# Patient Record
Sex: MALE | Race: WHITE | NOT HISPANIC OR LATINO | Employment: FULL TIME | URBAN - METROPOLITAN AREA
[De-identification: names, ages, dates, MRNs, and addresses within clinical notes are randomized per-mention and may not be internally consistent; named-entity substitution may affect disease eponyms.]

---

## 2017-04-27 ENCOUNTER — HOSPITAL ENCOUNTER (EMERGENCY)
Facility: HOSPITAL | Age: 52
Discharge: HOME | End: 2017-04-27
Attending: SURGERY
Payer: COMMERCIAL

## 2017-04-27 VITALS
WEIGHT: 315 LBS | BODY MASS INDEX: 42.66 KG/M2 | SYSTOLIC BLOOD PRESSURE: 162 MMHG | HEIGHT: 72 IN | DIASTOLIC BLOOD PRESSURE: 88 MMHG | HEART RATE: 86 BPM | TEMPERATURE: 98 F

## 2017-04-27 DIAGNOSIS — M79.661 RIGHT CALF PAIN: Primary | ICD-10-CM

## 2017-04-27 PROCEDURE — 99284 EMERGENCY DEPT VISIT MOD MDM: CPT | Mod: 25

## 2017-04-27 PROCEDURE — 96372 THER/PROPH/DIAG INJ SC/IM: CPT

## 2017-04-27 PROCEDURE — 63600175 PHARM REV CODE 636 W HCPCS: Performed by: SURGERY

## 2017-04-27 RX ORDER — ENOXAPARIN SODIUM 100 MG/ML
160 INJECTION SUBCUTANEOUS
Status: COMPLETED | OUTPATIENT
Start: 2017-04-27 | End: 2017-04-27

## 2017-04-27 RX ORDER — CYCLOBENZAPRINE HCL 10 MG
10 TABLET ORAL 3 TIMES DAILY PRN
Qty: 10 TABLET | Refills: 0 | Status: SHIPPED | OUTPATIENT
Start: 2017-04-27 | End: 2017-05-02

## 2017-04-27 RX ORDER — KETOROLAC TROMETHAMINE 10 MG/1
10 TABLET, FILM COATED ORAL EVERY 6 HOURS PRN
Qty: 15 TABLET | Refills: 0 | Status: SHIPPED | OUTPATIENT
Start: 2017-04-27 | End: 2017-08-09

## 2017-04-27 RX ORDER — ENOXAPARIN SODIUM 300 MG/3ML
1 INJECTION INTRAVENOUS; SUBCUTANEOUS
Status: DISCONTINUED | OUTPATIENT
Start: 2017-04-27 | End: 2017-04-27 | Stop reason: SDUPTHER

## 2017-04-27 RX ADMIN — ENOXAPARIN SODIUM 160 MG: 80 INJECTION, SOLUTION INTRAVENOUS; SUBCUTANEOUS at 02:04

## 2017-04-27 NOTE — ED PROVIDER NOTES
[facility]                                        April 27, 2017                   Chief Complaint  51 y.o. male with Leg Pain (right calf pain- thinks it's a blood clot)    History of Present Illness  Tavo Coffman presents to the emergency room with right calf pain this week  Patient has a history of for deep vein thrombosis in the left calf in the last 5-10 years  Patient was placed on Eliquis  has been out of his medications for 2-3 weeks now  The patient states that his insurance changed when his hours were reduced at work  The patient has a negative Homans sign good distal pulses and capillary refill today    The history is provided by the patient  Medical history: Carpal tunnel syndrome, knee pain, DVT, DM, HTN, neuropathy  Surgical history: Back surgery and blood clots?  ALLERGIES: Fish and shellfish containing products    Review of Systems and Physical Exam     Review of Systems  -- Constitution - no fever, denies fatigue, no weakness, no chills  -- Eyes - no tearing or redness, no visual disturbance  -- Ear, Nose - no tinnitus or earache, no nasal congestion or discharge  -- Mouth,Throat - no sore throat, no toothache, normal voice, normal swallowing  -- Respiratory - denies cough and congestion, no shortness of breath, no HERNDON  -- Cardiovascular - denies chest pain, no palpitations, denies claudication  -- Gastrointestinal - denies abdominal pain, nausea, vomiting, or diarrhea  -- Musculoskeletal - right calf pain for a week   -- Neurological - no headache, denies weakness or seizure; no LOC  -- Skin - denies pallor, rash, or changes in skin. no hives or welts noted    Vital Signs  -- His oral temperature is 97.5 °F (36.4 °C).   -- His blood pressure is 162/88 and his pulse is 86.      Physical Exam  -- Nursing note and vitals reviewed  -- Head: Atraumatic. Normocephalic. No obvious abnormality  -- Eyes: Pupils are equal and reactive to light. Normal conjunctiva and lids  --  Cardiac: Normal rate, regular rhythm and normal heart sounds  -- Pulmonary: Normal respiratory effort, breath sounds clear to auscultation  -- Abdominal: Soft, no tenderness. Normal bowel sounds. Normal liver edge  -- Musculoskeletal: Normal range of motion, no effusions. Joints stable   -- Neurological: No focal deficits. Showed good interaction with staff  -- Vascular: Posterior tibial, dorsalis pedis and radial pulses 2+ bilaterally      Emergency Room Course     Treatment and Evaluation  -- The US of the lower extremity performed in the ER today was negative for DVT    -- Lovenox 160 mg subcutaneous given in the ER  -- Patient given a card for 1 month supply of Eliquis on discharge    Diagnosis  -- The encounter diagnosis was Right calf pain.    Disposition and Plan  -- Disposition: home  -- Condition: stable  -- Follow-up: Patient to follow up with Phillip Pena MD in 1-2 days.  -- I advised the patient that we have found no life threatening condition today  -- At this time, I believe the patient is clinically stable for discharge.   -- The patient acknowledges that close follow up with a MD is required   -- Patient agrees to comply with all instruction and direction given in the ER    This note is dictated on Dragon Natural Speaking word recognition program.  There are word recognition mistakes that are occasionally missed on review.           Herman Aguiar MD  04/27/17 5844

## 2017-04-27 NOTE — ED TRIAGE NOTES
Patient comes in today stating that he thinks he has another DVT to his right lower leg.  He ran out of his Eliquis about a week ago.  He has a history of DVT in his left leg.

## 2017-04-27 NOTE — ED AVS SNAPSHOT
[facility] [address] [place] [address]               Tavo Coffman   2017 12:29 PM   ED    Description:  Male : 1965   Department:  [facility]           Your Care was Coordinated By:     Provider Role From To    Herman Aguiar MD Attending Provider 17 1230 --      Reason for Visit     Leg Pain           Diagnoses this Visit        Comments    Right calf pain    -  Primary       ED Disposition     ED Disposition Condition Comment    Discharge             To Do List           Follow-up Information     Follow up with Phillip Pena MD. Schedule an appointment as soon as possible for a visit in 2 days.    Specialty:  Internal Medicine    Contact information:     [address]  [place] [address]  [phone]         These Medications        Disp Refills Start End    ketorolac (TORADOL) 10 mg tablet 15 tablet 0 2017     Take 1 tablet (10 mg total) by mouth every 6 (six) hours as needed for Pain. - Oral    Pharmacy: [facility] [address] [place] [address] [facility] [address] Ph #: [phone]       cyclobenzaprine (FLEXERIL) 10 MG tablet 10 tablet 0 2017    Take 1 tablet (10 mg total) by mouth 3 (three) times daily as needed for Muscle spasms. - Oral    Pharmacy: [facility] [address] [place] [address] [facility] [address] Ph #: [phone]         [facility]     [facility] Nurse Care Line -  Assistance  Unless otherwise directed by your provider, please contact [facility], our nurse care line that is available for  assistance.     Registered nurses in the [facility] provide: appointment scheduling, clinical advisement, health education, and other advisory services.  Call: [phone] (toll free)               Medications           Message regarding Medications     Verify the changes and/or additions to your medication regime listed below are the same as discussed with your  clinician today.  If any of these changes or additions are incorrect, please notify your healthcare provider.        START taking these NEW medications        Refills    ketorolac (TORADOL) 10 mg tablet 0    Sig: Take 1 tablet (10 mg total) by mouth every 6 (six) hours as needed for Pain.    Class: Normal    Route: Oral    cyclobenzaprine (FLEXERIL) 10 MG tablet 0    Sig: Take 1 tablet (10 mg total) by mouth 3 (three) times daily as needed for Muscle spasms.    Class: Normal    Route: Oral      These medications were administered today        Dose Freq    enoxaparin injection 160 mg 160 mg ED 1 Time    Sig: Inject 1.6 mLs (160 mg total) into the skin ED 1 Time.    Class: Normal    Route: Subcutaneous      STOP taking these medications     aspirin (ECOTRIN) 81 MG EC tablet Take 1 tablet (81 mg total) by mouth once daily.    azithromycin (Z-DEB) 250 MG tablet Take 2 tabs on first day, then take 1 tab daily    hydrocodone-acetaminophen 5-325mg (NORCO) 5-325 mg per tablet Take 1 tablet by mouth every 8 (eight) hours as needed for Pain.    trazodone (DESYREL) 50 MG tablet Take 0.5 tablets (25 mg total) by mouth nightly as needed for Insomnia.           Verify that the below list of medications is an accurate representation of the medications you are currently taking.  If none reported, the list may be blank. If incorrect, please contact your healthcare provider. Carry this list with you in case of emergency.           Current Medications     ACCU-CHEK MARCELLE PLUS TEST STRP Strp 1 strip 2 (two) times daily.     apixaban (ELIQUIS) 2.5 mg Tab Take 1 tablet (2.5 mg total) by mouth 2 (two) times daily.    atorvastatin (LIPITOR) 40 MG tablet Take 1 tablet (40 mg total) by mouth once daily.    cholecalciferol, vitamin D3, 50,000 unit capsule Take 1 capsule (50,000 Units total) by mouth every 7 days.    fish oil-omega-3 fatty acids 300-1,000 mg capsule Take 2 g by mouth 2 (two) times daily. Take 2 capsules 2 times per day     gabapentin (NEURONTIN) 300 MG capsule Take 2 capsules (600 mg total) by mouth 3 (three) times daily.    glipiZIDE (GLUCOTROL) 5 MG tablet Take 1 tablet (5 mg total) by mouth once daily.    liraglutide 0.6 mg/0.1 mL, 18 mg/3 mL, subq PNIJ (VICTOZA 2-DEB) 0.6 mg/0.1 mL (18 mg/3 mL) PnIj Inject 1.2 mg into the skin once daily. Start low dose victoza 0.6 mg for seven days then Increase to victoza 1.2 mg daily.    lisinopril-hydrochlorothiazide (PRINZIDE,ZESTORETIC) 20-12.5 mg per tablet Take 2 tablets by mouth once daily.    metformin (GLUCOPHAGE) 1000 MG tablet Take 1 tablet (1,000 mg total) by mouth 2 (two) times daily with meals.    pyridoxine (B-6) 50 MG Tab Take 50 mg by mouth once daily.     SITagliptan (JANUVIA) 100 MG Tab Take 1 tablet (100 mg total) by mouth once daily.    cyclobenzaprine (FLEXERIL) 10 MG tablet Take 1 tablet (10 mg total) by mouth 3 (three) times daily as needed for Muscle spasms.    ketorolac (TORADOL) 10 mg tablet Take 1 tablet (10 mg total) by mouth every 6 (six) hours as needed for Pain.           Clinical Reference Information           Your Vitals Were     BP Pulse Temp Height Weight BMI    162/88 (BP Location: Left arm, Patient Position: Sitting) 86 97.5 °F (36.4 °C) (Oral) 6' (1.829 m) 163.3 kg (360 lb) 48.82 kg/m2      Allergies as of 4/27/2017        Reactions    Fish Containing Products Edema    Seafood, crabs    Shellfish Containing Products Edema      Immunizations Administered on Date of Encounter - 4/27/2017     None      ED Micro, Lab, POCT     None      ED Imaging Orders     Start Ordered       Status Ordering Provider    04/27/17 1234 04/27/17 1233  US Lower Extremity Veins Bilateral  1 time imaging      Final result       Your Scheduled Appointments     May 02, 2017  6:30 AM CDT   Fasting Lab with [facility]   [facility] ([facility])    [address]  [place] [address]   [phone]            May 02, 2017  9:00 AM CDT   Class  with CLASS, DIABETES BASICS   [facility] ([facility])    [address] [facility] [address]   [phone]            Aug 10, 2017  9:00 AM CDT   Established Patient Visit with MD HAMZAH Schmidt - Continuing Care ([facility])    [address] [facility] [address]   [phone]            Aug 17, 2017  8:30 AM CDT   Established Patient Visit with MD HAMZAH Little IV - Ophthalmology ([facility])    [address] [facility] [address] [facility] [address]   [phone]            Sep 11, 2017  8:00 AM CDT   Established Patient Visit with ORTHO MARYA CAMPOS - Orthopedics ([facility])    [address] [facility] [address]   [phone]              Smoking Cessation     If you would like to quit smoking:   You may be eligible for free services if you are a [place] resident and started smoking cigarettes before September 1, 1988.  Call the Smoking Cessation Trust (SCT) toll free at (430) 586-9143 or (520) 126-1857.   Call 1-800-QUIT-NOW if you do not meet the above criteria.   Contact us via email: [email or web address]   View our website for more information: [email or web address]         [facility] complies with applicable Federal civil rights laws and does not discriminate on the basis of race, color, national origin, age, disability, or sex.        Language Assistance Services     ATTENTION: Language assistance services are available, free of charge. Please call [phone].      ATENCIÓN: Si habla español, tiene a rasmussen disposición servicios gratuitos de asistencia lingüística. Llame al [phone].     CHÚ Ý: N?u b?n nói Ti?ng Vi?t, có các d?ch v? h? tr? ngôn ng? mi?n phí dành cho b?n. G?i s? [phone].

## 2017-08-10 PROBLEM — E66.01 OBESITY, MORBID, BMI 50 OR HIGHER: Status: ACTIVE | Noted: 2017-08-10

## 2017-08-10 PROBLEM — Z00.00 ROUTINE HEALTH MAINTENANCE: Status: ACTIVE | Noted: 2017-08-10

## 2017-08-10 PROBLEM — F32.A DEPRESSION: Status: ACTIVE | Noted: 2017-08-10

## 2017-10-31 PROBLEM — K62.5 RECTAL BLEEDING: Status: ACTIVE | Noted: 2017-10-31

## 2017-10-31 PROBLEM — Z12.11 SCREENING FOR COLON CANCER: Status: ACTIVE | Noted: 2017-10-31

## 2017-11-13 PROBLEM — Z00.00 ROUTINE HEALTH MAINTENANCE: Status: RESOLVED | Noted: 2017-08-10 | Resolved: 2017-11-13

## 2018-06-13 PROBLEM — G43.011 INTRACTABLE MIGRAINE WITHOUT AURA AND WITH STATUS MIGRAINOSUS: Status: ACTIVE | Noted: 2018-06-13

## 2018-08-09 ENCOUNTER — TELEPHONE (OUTPATIENT)
Dept: PHARMACY | Facility: CLINIC | Age: 53
End: 2018-08-09

## 2018-08-13 NOTE — TELEPHONE ENCOUNTER
DOCUMENTATION ONLY  Aimovig has not yet been added to the patient's prescription insurance formulary. It is currently under review for formulary inclusion with no estimated date for a decision.     [facility] will continue to follow up with the insurance company for a determination of coverage for Aimovig. We will reach out with more information as it becomes available.    Forwarded to Financial Assistance for  options. BRADLEY

## 2018-09-07 ENCOUNTER — PATIENT MESSAGE (OUTPATIENT)
Dept: ADMINISTRATIVE | Facility: OTHER | Age: 53
End: 2018-09-07

## 2018-09-10 ENCOUNTER — TELEPHONE (OUTPATIENT)
Dept: PHARMACY | Facility: CLINIC | Age: 53
End: 2018-09-10

## 2018-09-27 NOTE — TELEPHONE ENCOUNTER
FOR DOCUMENTATION ONLY:  Financial Assistance for Aimovig approved from 9/27/2018 to 09/27/2019  Source: Aimovig Bridge Copay Card:$0.00  BIN: [id number]  YOUNGN: BAL  ID: [id number]  GRP: [id number]

## 2018-10-09 NOTE — TELEPHONE ENCOUNTER
DOCUMENTATION ONLY:  Appeal for Aimovig approved from 10/09/2018 to 04/09/2019   Case ID:[id number]   Co-pay: $35.00  Patient Assistance IS  required    FOR DOCUMENTATION ONLY:   Financial Assistance for Aimovig approved from 9/27/2018 to 09/27/2019   Source: Aimovig Copay Card  Copay$5.00   BIN: [id number]   PCN: BAL   ID: [id number]   GRP: [id number]    MIRA

## 2018-10-23 ENCOUNTER — TELEPHONE (OUTPATIENT)
Dept: ADMINISTRATIVE | Facility: HOSPITAL | Age: 53
End: 2018-10-23

## 2018-11-02 ENCOUNTER — TELEPHONE (OUTPATIENT)
Dept: PHARMACY | Facility: CLINIC | Age: 53
End: 2018-11-02

## 2018-11-02 NOTE — TELEPHONE ENCOUNTER
Initial Aimovig consult completed on . Aimovig 140mg will be shipped on  to arrive at patient's home on  via FedEx. $5.00 copay and permission to charge CC on file. Patient has been on therapy for the past two months. Address confirmed. Confirmed 2 patient identifiers - name and . Therapy Appropriate.    Counseled patient on administration directions:  - Inject 140 mg (two auto-injectors) into the skin every 28 days.   - Take out of the refrigerator 30-60 minutes prior to injection.  - Wash hands before and after injection.  - Monthly RX will come with gauze, bandaids, and alcohol swabs.  - Patient may inject in either the tops of the thighs, abdomen- but at least 2 inches away from belly button, or the outer part of her upper arm (with assistance). If injecting 2 pens, use 2 different injection sites. Patient was instructed to rotate injections sites.  - Patient is to wipe down the injection site with the alcohol pad, wait to dry.    - Remove white cap from pen  - Gently squeeze OR stretch the area of the cleaned skin and hold it firmly.  Place the pen flat against the skin then push down on the purple button and release - there will be an initial click; in 10-15 seconds you will hear a second click and the window will go from from clear to yellow, indicating injection is complete.  - Patient should rotate injection sites.   - Patient will use sharps container; once full, per [place] law, she/ he may lock the sharps container and place in trash. Pharmacy will replace the sharps at no additional charge.    Patient was counseled on possible side effects:  - Injection site reaction: redness, soreness, itching, bruising, which should resolve within 3-5 days.  - constipation  - possible dizziness    Advised to keep a calendar to stay compliant. Consultation included: indication; goals of treatment; administration; storage and handling; side effects; how to handle side effects; the importance of compliance; the  importance of keeping all follow up appointments.  Patient understands to report any medication changes to [facility] and provider. All questions answered and addressed to patients satisfaction. I will f/u with patient in 7-10 days from start, [facility] to contact patient in 3 weeks for refills.     Omid sent at     Keon Burton PharmD  Clinical Pharmacist   [facility]   P: [phone]

## 2018-11-20 ENCOUNTER — TELEPHONE (OUTPATIENT)
Dept: PHARMACY | Facility: CLINIC | Age: 53
End: 2018-11-20

## 2018-12-05 NOTE — TELEPHONE ENCOUNTER
Call attempt 2 for Aimovig refill. Left voicemail and sent [employer] message. We will continue to reach out to the patient if we do not hear back from him.     Sabino Dewey, PharmD  Clinical Pharmacist  [facility]  P: [phone]

## 2018-12-07 NOTE — TELEPHONE ENCOUNTER
Call attempt 3 for Aimovig refill. Left voicemail for patient. Previous [employer] message has not been read. We will attempt to reach the patient once more.     Sabino Dewey, PharmD  Clinical Pharmacist  [facility]  P: [phone]

## 2018-12-18 NOTE — TELEPHONE ENCOUNTER
Patient returned call for Aimovig refill. Verified to ship on 12/19 for delivery on Thursday 12/20. $5.00 copay at 004.     Sabino Dewey, PharmD  Clinical Pharmacist  [facility]  P: [phone]

## 2019-01-10 ENCOUNTER — TELEPHONE (OUTPATIENT)
Dept: PHARMACY | Facility: CLINIC | Age: 54
End: 2019-01-10

## 2019-01-30 ENCOUNTER — TELEPHONE (OUTPATIENT)
Dept: PHARMACY | Facility: CLINIC | Age: 54
End: 2019-01-30

## 2019-03-04 ENCOUNTER — TELEPHONE (OUTPATIENT)
Dept: PHARMACY | Facility: CLINIC | Age: 54
End: 2019-03-04

## 2019-03-06 ENCOUNTER — TELEPHONE (OUTPATIENT)
Dept: PHARMACY | Facility: CLINIC | Age: 54
End: 2019-03-06

## 2019-03-27 ENCOUNTER — HOSPITAL ENCOUNTER (OUTPATIENT)
Dept: SLEEP MEDICINE | Facility: HOSPITAL | Age: 54
Discharge: HOME | End: 2019-03-27
Attending: INTERNAL MEDICINE
Payer: COMMERCIAL

## 2019-03-27 DIAGNOSIS — F51.02 TRANSIENT DISORDER OF INITIATING OR MAINTAINING SLEEP: ICD-10-CM

## 2019-03-27 PROCEDURE — 95811 POLYSOM 6/>YRS CPAP 4/> PARM: CPT

## 2019-04-02 ENCOUNTER — TELEPHONE (OUTPATIENT)
Dept: PHARMACY | Facility: CLINIC | Age: 54
End: 2019-04-02

## 2019-04-29 ENCOUNTER — TELEPHONE (OUTPATIENT)
Dept: PHARMACY | Facility: CLINIC | Age: 54
End: 2019-04-29

## 2019-04-29 NOTE — TELEPHONE ENCOUNTER
LVM for callback regarding update in insurance.  Rx coverage rejecting as coverage ended 3/31/2019.

## 2019-05-01 ENCOUNTER — PATIENT MESSAGE (OUTPATIENT)
Dept: PHARMACY | Facility: CLINIC | Age: 54
End: 2019-05-01

## 2019-05-02 NOTE — TELEPHONE ENCOUNTER
Action Required:     I have faxed over [employer] forms to your office. Completion of the Assistance forms need to be completed by the physician for the patients Aimovig prescription assistance. Please complete the prescription sections of the forms.     Once completed, you can fax them to [facility]. Any questions or concerns regarding the program or completion of the forms, please reach out to [facility].     MD and staff Message sent

## 2019-05-13 NOTE — TELEPHONE ENCOUNTER
We have been unsuccessful at reaching the patient to obtain necessary documents to apply for assistance. We have mailed them a postcard and we are now opting them out of our services until further contact is made. Please let me know if there is anything further I can do to be of assistance.    Thank you,  Carrol Garcia  [facility].  [facility] [id number]

## 2020-02-07 PROBLEM — Z79.01 CHRONIC ANTICOAGULATION: Status: ACTIVE | Noted: 2020-02-07

## 2020-02-07 PROBLEM — Z79.01 CHRONIC ANTICOAGULATION: Chronic | Status: ACTIVE | Noted: 2020-02-07

## 2020-07-22 PROBLEM — Z79.01 CHRONIC ANTICOAGULATION: Chronic | Status: RESOLVED | Noted: 2020-02-07 | Resolved: 2020-07-22

## 2020-07-22 PROBLEM — N49.2 SCROTAL ABSCESS: Status: ACTIVE | Noted: 2020-07-22

## 2021-01-19 PROBLEM — L02.91 ABSCESS: Status: ACTIVE | Noted: 2021-01-19

## 2021-06-11 ENCOUNTER — PATIENT OUTREACH (OUTPATIENT)
Dept: ADMINISTRATIVE | Facility: HOSPITAL | Age: 56
End: 2021-06-11

## 2021-06-16 ENCOUNTER — PATIENT OUTREACH (OUTPATIENT)
Dept: ADMINISTRATIVE | Facility: HOSPITAL | Age: 56
End: 2021-06-16

## 2021-06-21 PROBLEM — G43.011 INTRACTABLE MIGRAINE WITHOUT AURA AND WITH STATUS MIGRAINOSUS: Status: RESOLVED | Noted: 2018-06-13 | Resolved: 2021-06-21

## 2021-07-20 ENCOUNTER — PATIENT OUTREACH (OUTPATIENT)
Dept: ADMINISTRATIVE | Facility: HOSPITAL | Age: 56
End: 2021-07-20

## 2021-07-23 ENCOUNTER — TELEPHONE (OUTPATIENT)
Dept: ADMINISTRATIVE | Facility: HOSPITAL | Age: 56
End: 2021-07-23

## 2021-07-26 ENCOUNTER — PATIENT OUTREACH (OUTPATIENT)
Dept: ADMINISTRATIVE | Facility: HOSPITAL | Age: 56
End: 2021-07-26

## 2021-07-28 ENCOUNTER — PATIENT MESSAGE (OUTPATIENT)
Dept: ADMINISTRATIVE | Facility: OTHER | Age: 56
End: 2021-07-28

## 2021-10-18 ENCOUNTER — PATIENT MESSAGE (OUTPATIENT)
Dept: OTHER | Facility: OTHER | Age: 56
End: 2021-10-18
Payer: MEDICAID

## 2021-10-26 ENCOUNTER — TELEPHONE (OUTPATIENT)
Dept: PHARMACY | Facility: CLINIC | Age: 56
End: 2021-10-26
Payer: MEDICAID

## 2021-12-26 ENCOUNTER — PATIENT MESSAGE (OUTPATIENT)
Dept: OTHER | Facility: OTHER | Age: 56
End: 2021-12-26
Payer: MEDICAID

## 2022-01-13 ENCOUNTER — PATIENT MESSAGE (OUTPATIENT)
Dept: OTHER | Facility: OTHER | Age: 57
End: 2022-01-13
Payer: MEDICAID

## 2022-01-29 DIAGNOSIS — U07.1 COVID-19 VIRUS DETECTED: ICD-10-CM

## 2022-04-14 ENCOUNTER — PATIENT MESSAGE (OUTPATIENT)
Dept: OTHER | Facility: OTHER | Age: 57
End: 2022-04-14
Payer: MEDICAID

## 2022-04-18 ENCOUNTER — PATIENT MESSAGE (OUTPATIENT)
Dept: OTHER | Facility: OTHER | Age: 57
End: 2022-04-18
Payer: MEDICAID

## 2022-05-03 ENCOUNTER — PATIENT MESSAGE (OUTPATIENT)
Dept: OTHER | Facility: OTHER | Age: 57
End: 2022-05-03
Payer: MEDICAID

## 2022-08-01 ENCOUNTER — PATIENT MESSAGE (OUTPATIENT)
Dept: OTHER | Facility: OTHER | Age: 57
End: 2022-08-01
Payer: MEDICAID

## 2022-08-25 PROBLEM — M86.9 OSTEOMYELITIS OF FINGER OF RIGHT HAND: Status: ACTIVE | Noted: 2022-08-25

## 2022-11-13 ENCOUNTER — PATIENT MESSAGE (OUTPATIENT)
Dept: OTHER | Facility: OTHER | Age: 57
End: 2022-11-13
Payer: MEDICAID

## 2023-01-09 ENCOUNTER — PATIENT MESSAGE (OUTPATIENT)
Dept: OTHER | Facility: OTHER | Age: 58
End: 2023-01-09
Payer: MEDICAID

## 2023-01-31 ENCOUNTER — PATIENT MESSAGE (OUTPATIENT)
Dept: ADMINISTRATIVE | Facility: OTHER | Age: 58
End: 2023-01-31
Payer: MEDICAID

## 2023-04-05 PROBLEM — M54.50 CHRONIC MIDLINE LOW BACK PAIN WITHOUT SCIATICA: Status: ACTIVE | Noted: 2023-04-05

## 2023-04-05 PROBLEM — G89.29 CHRONIC MIDLINE LOW BACK PAIN WITHOUT SCIATICA: Status: ACTIVE | Noted: 2023-04-05

## 2023-06-15 ENCOUNTER — PATIENT OUTREACH (OUTPATIENT)
Dept: ADMINISTRATIVE | Facility: HOSPITAL | Age: 58
End: 2023-06-15
Payer: MEDICAID

## 2023-08-09 ENCOUNTER — PATIENT OUTREACH (OUTPATIENT)
Dept: ADMINISTRATIVE | Facility: HOSPITAL | Age: 58
End: 2023-08-09

## 2023-08-11 DIAGNOSIS — E11.9 TYPE 2 DIABETES MELLITUS WITHOUT COMPLICATION: ICD-10-CM

## 2024-02-18 DIAGNOSIS — U07.1 COVID-19 VIRUS DETECTED: ICD-10-CM

## 2024-03-26 ENCOUNTER — PATIENT OUTREACH (OUTPATIENT)
Dept: ADMINISTRATIVE | Facility: HOSPITAL | Age: 59
End: 2024-03-26

## 2024-04-06 PROBLEM — R31.29 MICROSCOPIC HEMATURIA: Status: ACTIVE | Noted: 2024-04-06

## 2024-04-06 PROBLEM — F32.A DEPRESSION: Status: RESOLVED | Noted: 2017-08-10 | Resolved: 2024-04-06

## 2024-04-06 PROBLEM — R07.9 CHEST PAIN: Status: ACTIVE | Noted: 2024-04-06

## 2024-04-06 PROBLEM — G47.33 OSA (OBSTRUCTIVE SLEEP APNEA): Status: ACTIVE | Noted: 2024-04-06

## 2024-04-06 PROBLEM — G47.9 DIFFICULTY SLEEPING: Status: ACTIVE | Noted: 2024-04-06

## 2024-04-06 PROBLEM — E78.5 HYPERLIPIDEMIA: Status: ACTIVE | Noted: 2024-04-06

## 2024-04-07 PROBLEM — I20.0 UNSTABLE ANGINA: Status: ACTIVE | Noted: 2024-04-06

## 2024-04-11 ENCOUNTER — PATIENT MESSAGE (OUTPATIENT)
Dept: ADMINISTRATIVE | Facility: CLINIC | Age: 59
End: 2024-04-11

## 2024-04-11 ENCOUNTER — PATIENT OUTREACH (OUTPATIENT)
Dept: ADMINISTRATIVE | Facility: CLINIC | Age: 59
End: 2024-04-11

## 2024-04-11 NOTE — PROGRESS NOTES
C3 nurse attempted to contact Tavo Coffman  for a TCC post hospital discharge follow up call. No answer. Left voicemail with callback information. The patient has a scheduled HOSFU appointment with MARYA Orozco  on 04/15/2024 @ 7586.     Message sent to PCP staff.

## 2024-04-12 ENCOUNTER — PATIENT OUTREACH (OUTPATIENT)
Dept: ADMINISTRATIVE | Facility: CLINIC | Age: 59
End: 2024-04-12

## 2024-04-12 DIAGNOSIS — I20.0 UNSTABLE ANGINA: Primary | ICD-10-CM

## 2024-04-15 PROBLEM — R07.9 CHEST PAIN: Status: ACTIVE | Noted: 2024-04-15

## 2024-06-21 PROBLEM — N41.0 PROSTATITIS, ACUTE: Status: ACTIVE | Noted: 2024-06-21

## 2024-06-26 ENCOUNTER — PATIENT OUTREACH (OUTPATIENT)
Dept: ADMINISTRATIVE | Facility: CLINIC | Age: 59
End: 2024-06-26

## 2024-06-26 PROBLEM — R78.81 MRSA BACTEREMIA: Status: ACTIVE | Noted: 2024-06-26

## 2024-06-26 PROBLEM — R74.8 ELEVATED ALKALINE PHOSPHATASE LEVEL: Status: ACTIVE | Noted: 2024-06-26

## 2024-06-26 PROBLEM — B95.62 MRSA BACTEREMIA: Status: ACTIVE | Noted: 2024-06-26

## 2024-07-02 PROBLEM — R51.9 HEADACHE: Status: ACTIVE | Noted: 2024-07-02

## 2024-07-09 PROBLEM — R19.7 DIARRHEA OF PRESUMED INFECTIOUS ORIGIN: Status: ACTIVE | Noted: 2024-07-09

## 2024-07-09 PROBLEM — R07.9 CHEST PAIN: Status: RESOLVED | Noted: 2024-04-15 | Resolved: 2024-07-09

## 2024-07-09 PROBLEM — I48.0 PAROXYSMAL ATRIAL FIBRILLATION: Status: ACTIVE | Noted: 2024-07-09

## 2024-07-14 PROBLEM — R19.7 DIARRHEA OF PRESUMED INFECTIOUS ORIGIN: Status: RESOLVED | Noted: 2024-07-09 | Resolved: 2024-07-14

## 2024-07-16 ENCOUNTER — PATIENT OUTREACH (OUTPATIENT)
Dept: ADMINISTRATIVE | Facility: CLINIC | Age: 59
End: 2024-07-16

## 2024-07-16 NOTE — PROGRESS NOTES
C3 nurse attempted to contact Tavo Coffman  for a TCC post hospital discharge follow up call. No answer. No voicemail available.The patient does not have a scheduled HOSFU appointment.   Message sent via [facility] portal for Post Discharge Attempt.

## 2024-07-17 ENCOUNTER — PATIENT MESSAGE (OUTPATIENT)
Dept: ADMINISTRATIVE | Facility: CLINIC | Age: 59
End: 2024-07-17

## 2024-07-20 PROBLEM — R74.8 ELEVATED ALKALINE PHOSPHATASE LEVEL: Status: RESOLVED | Noted: 2024-06-26 | Resolved: 2024-07-20

## 2024-08-02 ENCOUNTER — EXTERNAL HOME HEALTH (OUTPATIENT)
Dept: HOME HEALTH SERVICES | Facility: HOSPITAL | Age: 59
End: 2024-08-02

## 2024-08-06 ENCOUNTER — DOCUMENT SCAN (OUTPATIENT)
Dept: HOME HEALTH SERVICES | Facility: HOSPITAL | Age: 59
End: 2024-08-06

## 2024-08-08 ENCOUNTER — PATIENT MESSAGE (OUTPATIENT)
Dept: ADMINISTRATIVE | Facility: HOSPITAL | Age: 59
End: 2024-08-08

## 2024-10-11 ENCOUNTER — PATIENT MESSAGE (OUTPATIENT)
Dept: ADMINISTRATIVE | Facility: HOSPITAL | Age: 59
End: 2024-10-11

## 2024-10-24 PROBLEM — E11.621 TYPE 2 DIABETES MELLITUS WITH FOOT ULCER, WITH LONG-TERM CURRENT USE OF INSULIN: Status: ACTIVE | Noted: 2024-10-24

## 2024-10-24 PROBLEM — L97.509 TYPE 2 DIABETES MELLITUS WITH FOOT ULCER, WITH LONG-TERM CURRENT USE OF INSULIN: Status: ACTIVE | Noted: 2024-10-24

## 2024-10-24 PROBLEM — Z79.4 TYPE 2 DIABETES MELLITUS WITH FOOT ULCER, WITH LONG-TERM CURRENT USE OF INSULIN: Status: ACTIVE | Noted: 2024-10-24

## 2024-10-24 PROBLEM — I16.1 HYPERTENSIVE EMERGENCY: Status: ACTIVE | Noted: 2024-10-24

## 2024-10-25 PROBLEM — I16.1 HYPERTENSIVE EMERGENCY: Status: RESOLVED | Noted: 2024-10-24 | Resolved: 2024-10-25

## 2024-11-26 ENCOUNTER — PATIENT MESSAGE (OUTPATIENT)
Dept: ADMINISTRATIVE | Facility: HOSPITAL | Age: 59
End: 2024-11-26

## 2024-12-19 PROBLEM — E11.628 TYPE 2 DIABETES MELLITUS WITH SKIN COMPLICATION, WITH LONG-TERM CURRENT USE OF INSULIN: Status: ACTIVE | Noted: 2024-12-19

## 2024-12-19 PROBLEM — Z79.4 TYPE 2 DIABETES MELLITUS WITH SKIN COMPLICATION, WITH LONG-TERM CURRENT USE OF INSULIN: Status: ACTIVE | Noted: 2024-12-19

## 2024-12-19 PROBLEM — I82.401 ACUTE DEEP VEIN THROMBOSIS (DVT) OF RIGHT LOWER EXTREMITY: Status: ACTIVE | Noted: 2024-12-19

## 2024-12-23 ENCOUNTER — PATIENT MESSAGE (OUTPATIENT)
Dept: ADMINISTRATIVE | Facility: HOSPITAL | Age: 59
End: 2024-12-23

## 2024-12-25 PROBLEM — M79.604 RIGHT LEG PAIN: Status: ACTIVE | Noted: 2024-12-25

## 2024-12-26 PROBLEM — G43.709 CHRONIC MIGRAINE WITHOUT AURA: Status: ACTIVE | Noted: 2020-04-03

## 2024-12-27 ENCOUNTER — ANTI-COAG VISIT (OUTPATIENT)
Dept: CARDIOLOGY | Facility: CLINIC | Age: 59
End: 2024-12-27

## 2024-12-27 DIAGNOSIS — Z79.01 ANTICOAGULANT LONG-TERM USE: Primary | ICD-10-CM

## 2024-12-27 DIAGNOSIS — I82.409 RECURRENT DEEP VEIN THROMBOSIS (DVT): ICD-10-CM

## 2024-12-27 NOTE — PROGRESS NOTES
60 y/o M w/ hx DMII, recurrent DVTs on Xarelto (since 1992, first occurred after surgery), pAF, HTN, HLD, chronic back pain, venous stasis ulcers and MRSA bacteremia who presented to ED with complaints of RLE pain and swelling x 2 days.  US confirms DVT RLE.  Discussed with oncology, will require transition back to warfarin due to DOAC failure with higher INR goal as warfarin failed w/ goal 2.0-3.0.

## 2024-12-31 NOTE — PROGRESS NOTES
Pt has been d/c'd from the hospital on lovenox bridge + high dose warfarin 25mg po daily.  Recommend to adjust dosing per calendar + continue lovenox bridge until pt has been WNL for minimum 48hrs.  Plan to re-assess Thursday. INR moved appropriately w/ warfarin 20mg daily; however, not quite to target.  Pt would likely benefit from alternating 20mg/25mg dosing ~15% increase.  Will continue to monitor closely.

## 2025-01-03 ENCOUNTER — PATIENT MESSAGE (OUTPATIENT)
Dept: CARDIOLOGY | Facility: CLINIC | Age: 60
End: 2025-01-03

## 2025-01-03 NOTE — PROGRESS NOTES
Pt not reached until today & took a lower dose than recommended.  Will advise per calendar & f/u has been r/s'd.

## 2025-01-03 NOTE — PROGRESS NOTES
Spoke to patient regarding enrollment into the Coumadin Clinic. Patient verified that he has a (peach) 5 mg warfarin tablet, and a warfarin regimen of 20 mg (4 tablets) qPM. INR scheduled for 01.06.2025.    Patient educated on diet and when to call the coumadin Clinic. Patient will eat foods with vitamin K daily, and avoid EtOH. The risks associated with consuming EtOH, while taking warfarin, and the importance of a consistent diet discussed. Questions and concerns addressed at this time, and he expressed understanding. Education sheets sent via patient portal to reinforce teaching.     Update - Patient advised on boosted warfarin dosing of 25 mg 1/3-1/4.

## 2025-01-06 ENCOUNTER — ANTI-COAG VISIT (OUTPATIENT)
Dept: CARDIOLOGY | Facility: CLINIC | Age: 60
End: 2025-01-06

## 2025-01-06 DIAGNOSIS — I82.409 RECURRENT DEEP VEIN THROMBOSIS (DVT): Primary | ICD-10-CM

## 2025-01-07 NOTE — PROGRESS NOTES
[facility]    2025 12:00 PM    Assessment/Plan:    Patient presents today with therapeutic INR.    Assessment of patient findings and chart review: Pt took a lower dose than recommended.  See calendar.     Recommendation for patient's warfarin regimen: Ok to d/c lovenox & will adjust warfarin per calendar.     Recommend repeat INR Thursday.   _________________________________________________________________    Tavo Coffman (59 y.o.) is followed by the [employer] [facility].    Anticoagulation Summary  As of 2025      INR goal:  2.5-3.5   TTR:  --   INR used for dosin.8 (2025)   Warfarin maintenance plan:  No maintenance plan   Plan last modified:  Annabella Garrett, PharmD (2024)   Next INR check:  2025   Target end date:  Indefinite    Indications    Recurrent deep vein thrombosis (DVT) [I82.409]                 Anticoagulation Episode Summary       INR check location:  Clinic Lab    Preferred lab:  --    Send INR reminders to:  [facility] COUMADIN MONITORING POOL    Comments:  [facility]          Anticoagulation Care Providers       Provider Role Specialty Phone number    Daniel Albert MD [facility] Hematology and Oncology [phone]

## 2025-01-09 ENCOUNTER — ANTI-COAG VISIT (OUTPATIENT)
Dept: CARDIOLOGY | Facility: CLINIC | Age: 60
End: 2025-01-09

## 2025-01-09 ENCOUNTER — PATIENT MESSAGE (OUTPATIENT)
Dept: CARDIOLOGY | Facility: CLINIC | Age: 60
End: 2025-01-09

## 2025-01-09 DIAGNOSIS — I82.409 RECURRENT DEEP VEIN THROMBOSIS (DVT): Primary | ICD-10-CM

## 2025-01-09 NOTE — PROGRESS NOTES
[facility]    01/09/2025 11:31 AM    Assessment/Plan:    Patient presents today with therapeutic INR.    Assessment of patient findings and chart review: Reviewed    Recommendation for patient's warfarin regimen: Continue current maintenance dose    Recommend repeat INR in 1 week  _________________________________________________________________    Tavo Coffman (59 y.o.) is followed by the [employer] [facility] Management Program.    Anticoagulation Summary  As of 1/9/2025      INR goal:  2.5-3.5   TTR:  100.0% (3 d)   INR used for dosing:  3.5 (1/9/2025)   Warfarin maintenance plan:  No maintenance plan   Plan last modified:  Annabella Garrett, PharmD (12/31/2024)   Next INR check:  1/16/2025   Target end date:  Indefinite    Indications    Recurrent deep vein thrombosis (DVT) [I82.409]                 Anticoagulation Episode Summary       INR check location:  Clinic Lab    Preferred lab:  --    Send INR reminders to:  [facility] COUMADIN MONITORING POOL    Comments:  [facility]          Anticoagulation Care Providers       Provider Role Specialty Phone number    Daniel Albert MD [facility] Hematology and Oncology [phone]

## 2025-01-14 ENCOUNTER — PATIENT MESSAGE (OUTPATIENT)
Dept: CARDIOLOGY | Facility: CLINIC | Age: 60
End: 2025-01-14

## 2025-01-16 ENCOUNTER — ANTI-COAG VISIT (OUTPATIENT)
Dept: CARDIOLOGY | Facility: CLINIC | Age: 60
End: 2025-01-16

## 2025-01-16 DIAGNOSIS — I82.409 RECURRENT DEEP VEIN THROMBOSIS (DVT): Primary | ICD-10-CM

## 2025-01-16 NOTE — PROGRESS NOTES
[facility]    2025 8:29 AM    Assessment/Plan:    Patient presents today with subtherapeutic  INR.    Assessment of patient findings and chart review: Reviewed    Recommendation for patient's warfarin regimen: Increase maintenance dose    Recommend repeat INR in 1 week  _________________________________________________________________    Tavo Jaswant Coffman (59 y.o.) is followed by the [employer] [facility]tico[facility] Management Program.    Anticoagulation Summary  As of 2025      INR goal:  2.5-3.5   TTR:  93.7% (1.3 wk)   INR used for dosin.4 (2025)   Warfarin maintenance plan:  25 mg (5 mg x 5) every Tue, Thu, Sat; 20 mg (5 mg x 4) all other days   Weekly warfarin total:  155 mg   Plan last modified:  Annabella Garrett, PharmD (2025)   Next INR check:  2025   Target end date:  Indefinite    Indications    Recurrent deep vein thrombosis (DVT) [I82.409]                 Anticoagulation Episode Summary       INR check location:  Clinic Lab    Preferred lab:  --    Send INR reminders to:  [facility] COUMADIN MONITORING POOL    Comments:  [facility]          Anticoagulation Care Providers       Provider Role Specialty Phone number    Daniel Albert MD [facility] Hematology and Oncology [phone]

## 2025-02-27 ENCOUNTER — ANTI-COAG VISIT (OUTPATIENT)
Dept: CARDIOLOGY | Facility: CLINIC | Age: 60
End: 2025-02-27

## 2025-02-27 DIAGNOSIS — I82.409 RECURRENT DEEP VEIN THROMBOSIS (DVT): Primary | ICD-10-CM

## 2025-02-28 NOTE — PROGRESS NOTES
[facility]    2025 8:56 AM    Assessment/Plan:    Patient presents today with subtherapeutic  INR.    Assessment of patient findings and chart review: Pt likely missed a dose as he is at baseline.  See findings.     Recommendation for patient's warfarin regimen: Boost dose today to 25mg then resume current maintenance dose    Recommend repeat INR in 1 week  _________________________________________________________________    Tavo Jaswant Coffman (59 y.o.) is followed by the [facility].    Anticoagulation Summary  As of 2025      INR goal:  2.5-3.5   TTR:  17.9% (1.7 mo)   INR used for dosin.0 (2025)   Warfarin maintenance plan:  25 mg (5 mg x 5) every e, Thu, Sat; 20 mg (5 mg x 4) all other days   Weekly warfarin total:  155 mg   Plan last modified:  Annabella Garrett, PharmD (2025)   Next INR check:  3/6/2025   Target end date:  Indefinite    Indications    Recurrent deep vein thrombosis (DVT) [I82.409]                 Anticoagulation Episode Summary       INR check location:  Clinic Lab    Preferred lab:  --    Send INR reminders to:  [facility] COUMADIN MONITORING POOL    Comments:  [facility]          Anticoagulation Care Providers       Provider Role Specialty Phone number    Daniel Albert MD [facility] Hematology and Oncology [phone]

## 2025-03-06 ENCOUNTER — ANTI-COAG VISIT (OUTPATIENT)
Dept: CARDIOLOGY | Facility: CLINIC | Age: 60
End: 2025-03-06

## 2025-03-06 DIAGNOSIS — I82.409 RECURRENT DEEP VEIN THROMBOSIS (DVT): Primary | ICD-10-CM

## 2025-03-06 NOTE — PROGRESS NOTES
[facility]    2025 4:16 PM    Assessment/Plan:    Patient presents today with subtherapeutic  INR.    Assessment of patient findings and chart review: Pt took a lower dose than recommended.     Recommendation for patient's warfarin regimen: Increase per calendar     Recommend repeat INR Tuesday  _________________________________________________________________    Tavo Jaswant Coffman (59 y.o.) is followed by the [facility].    Anticoagulation Summary  As of 3/6/2025      INR goal:  2.5-3.5   TTR:  15.8% (1.9 mo)   INR used for dosin.2 (3/6/2025)   Warfarin maintenance plan:  25 mg (5 mg x 5) every Tue, Thu, Sat; 20 mg (5 mg x 4) all other days   Weekly warfarin total:  155 mg   Plan last modified:  Annabella Garrett, PharmD (2025)   Next INR check:  3/11/2025   Target end date:  Indefinite    Indications    Recurrent deep vein thrombosis (DVT) [I82.409]                 Anticoagulation Episode Summary       INR check location:  Clinic Lab    Preferred lab:  --    Send INR reminders to:  [facility] COUMADIN MONITORING POOL    Comments:  [facility]          Anticoagulation Care Providers       Provider Role Specialty Phone number    Daniel Albert MD [facility] Hematology and Oncology [phone]

## 2025-03-11 ENCOUNTER — ANTI-COAG VISIT (OUTPATIENT)
Dept: CARDIOLOGY | Facility: CLINIC | Age: 60
End: 2025-03-11
Payer: MEDICAID

## 2025-03-11 DIAGNOSIS — I82.409 RECURRENT DEEP VEIN THROMBOSIS (DVT): Primary | ICD-10-CM

## 2025-03-17 NOTE — PROGRESS NOTES
[facility]    2025 9:19 AM    Assessment/Plan:    Patient presents today with subtherapeutic  INR.    Assessment of patient findings and chart review: NOTE: INR from 3/11.  Notified of upcoming c-scope. Will advise per calendar.     Recommendation for patient's warfarin regimen: per calendar.  Hold 5 days prior to procedure.     Recommend repeat INR 3/27/25  _________________________________________________________________    Tavo Coffman (59 y.o.) is followed by the [facility].    Anticoagulation Summary  As of 3/11/2025      INR goal:  2.5-3.5   TTR:  14.6% (2.1 mo)   INR used for dosin.1 (3/11/2025)   Warfarin maintenance plan:  25 mg (5 mg x 5) every Tue, Thu, Sat; 20 mg (5 mg x 4) all other days   Weekly warfarin total:  155 mg   Plan last modified:  Annabella Garrett, PharmD (2025)   Next INR check:  3/27/2025   Target end date:  Indefinite    Indications    Recurrent deep vein thrombosis (DVT) [I82.409]                 Anticoagulation Episode Summary       INR check location:  Clinic Lab    Preferred lab:  --    Send INR reminders to:  [facility] COUMADIN MONITORING POOL    Comments:  [facility]          Anticoagulation Care Providers       Provider Role Specialty Phone number    Daniel Albert MD Responsible Hematology and Oncology [phone]

## 2025-03-18 NOTE — PROGRESS NOTES
Received messaged from Dr. SANTI Albert that pt doesn't have an interest in seeing them as he's N/S'd for a few appts.    Messaged Dr. East to assume responsibility as pt states he still sees PCP.

## 2025-03-31 ENCOUNTER — ANTI-COAG VISIT (OUTPATIENT)
Dept: CARDIOLOGY | Facility: CLINIC | Age: 60
End: 2025-03-31
Payer: MEDICAID

## 2025-03-31 ENCOUNTER — PATIENT MESSAGE (OUTPATIENT)
Dept: ADMINISTRATIVE | Facility: HOSPITAL | Age: 60
End: 2025-03-31
Payer: MEDICAID

## 2025-03-31 DIAGNOSIS — I82.409 RECURRENT DEEP VEIN THROMBOSIS (DVT): Primary | ICD-10-CM

## 2025-03-31 NOTE — PROGRESS NOTES
[facility]    2025 4:22 PM    Assessment/Plan:    Patient presents today with subtherapeutic  INR.    Assessment of patient findings and chart review: Reviewed.  Pt subtherapeutic and unable to verify dosing.     Recommendation for patient's warfarin regimen: Boost dose today to 25mg then resume current maintenance dose    Recommend repeat INR Thursday.   _________________________________________________________________    Tavo Michaud Moniqueandrzej (59 y.o.) is followed by the [email or web address].    Anticoagulation Summary  As of 3/31/2025      INR goal:  2.5-3.5   TTR:  11.1% (2.8 mo)   INR used for dosin.9 (3/31/2025)   Warfarin maintenance plan:  25 mg (5 mg x 5) every Tue, Thu, Sat; 20 mg (5 mg x 4) all other days   Weekly warfarin total:  155 mg   Plan last modified:  Annabella Garrett, PharmD (2025)   Next INR check:  4/3/2025   Target end date:  Indefinite    Indications    Recurrent deep vein thrombosis (DVT) [I82.409]                 Anticoagulation Episode Summary       INR check location:  Clinic Lab    Preferred lab:  --    Send INR reminders to:  [facility] COUMADIN MONITORING POOL    Comments:  [facility]          Anticoagulation Care Providers       Provider Role Specialty Phone number    Paddy East MD [facility] Internal Medicine [phone]

## 2025-04-08 ENCOUNTER — ANTI-COAG VISIT (OUTPATIENT)
Dept: CARDIOLOGY | Facility: CLINIC | Age: 60
End: 2025-04-08
Payer: MEDICAID

## 2025-04-08 DIAGNOSIS — I82.409 RECURRENT DEEP VEIN THROMBOSIS (DVT): Primary | ICD-10-CM

## 2025-04-08 NOTE — PROGRESS NOTES
[facility]    2025 1:57 PM    Assessment/Plan:    Patient presents today with subtherapeutic  INR.    Assessment of patient findings and chart review: INR prior to c-scope.  Pt to resume 4/3 unless otherwise advise by provider.     Recommendation for patient's warfarin regimen:   Per calendar.   Recommend repeat INR 25  _________________________________________________________________    Tavo Coffman (59 y.o.) is followed by the [facility].    Anticoagulation Summary  As of 2025      INR goal:  2.5-3.5   TTR:  10.1% (3 mo)   INR used for dosin.0 (2025)   Warfarin maintenance plan:  25 mg (5 mg x 5) every Tue, Thu, Sat; 20 mg (5 mg x 4) all other days   Weekly warfarin total:  155 mg   Plan last modified:  Annabella Garrett, PharmD (2025)   Next INR check:  2025   Target end date:  Indefinite    Indications    Recurrent deep vein thrombosis (DVT) [I82.409]                 Anticoagulation Episode Summary       INR check location:  Clinic Lab    Preferred lab:  --    Send INR reminders to:  [facility] COUMADIN MONITORING POOL    Comments:  [facility]          Anticoagulation Care Providers       Provider Role Specialty Phone number    Paddy East MD [facility] Internal Medicine [phone]

## 2025-04-16 ENCOUNTER — ANTI-COAG VISIT (OUTPATIENT)
Dept: CARDIOLOGY | Facility: CLINIC | Age: 60
End: 2025-04-16
Payer: MEDICAID

## 2025-04-16 DIAGNOSIS — I82.409 RECURRENT DEEP VEIN THROMBOSIS (DVT): Primary | ICD-10-CM

## 2025-04-16 NOTE — PROGRESS NOTES
[facility]    2025 2:28 PM    Assessment/Plan:    Patient presents today with subtherapeutic  INR.    Assessment of patient findings and chart review: Pt took an equivalent dose.  See calendar.  Recommend to boost per calendar & re-assess. Pt also to write down/repeat back dosing.     Recommendation for patient's warfarin regimen: Boost dose today to 30mg then resume current maintenance dose    Recommend repeat INR in 1 week  _________________________________________________________________    Tavo Coffman (59 y.o.) is followed by the [facility].    Anticoagulation Summary  As of 2025      INR goal:  2.5-3.5   TTR:  9.3% (3.3 mo)   INR used for dosin.9 (2025)   Warfarin maintenance plan:  25 mg (5 mg x 5) every Tue, Thu, Sat; 20 mg (5 mg x 4) all other days   Weekly warfarin total:  155 mg   Plan last modified:  Annabella Garrett, PharmD (2025)   Next INR check:  2025   Target end date:  Indefinite    Indications    Recurrent deep vein thrombosis (DVT) [I82.409]                 Anticoagulation Episode Summary       INR check location:  Clinic Lab    Preferred lab:  --    Send INR reminders to:  [facility] COUMADIN MONITORING POOL    Comments:  [facility]          Anticoagulation Care Providers       Provider Role Specialty Phone number    Paddy East MD [facility] Internal Medicine [phone]

## 2025-04-23 DIAGNOSIS — E11.65 TYPE 2 DIABETES MELLITUS WITH HYPERGLYCEMIA, WITH LONG-TERM CURRENT USE OF INSULIN: Primary | ICD-10-CM

## 2025-04-23 DIAGNOSIS — Z79.4 TYPE 2 DIABETES MELLITUS WITH HYPERGLYCEMIA, WITH LONG-TERM CURRENT USE OF INSULIN: Primary | ICD-10-CM

## 2025-04-24 ENCOUNTER — ANTI-COAG VISIT (OUTPATIENT)
Dept: CARDIOLOGY | Facility: CLINIC | Age: 60
End: 2025-04-24
Payer: MEDICAID

## 2025-04-24 DIAGNOSIS — I82.409 RECURRENT DEEP VEIN THROMBOSIS (DVT): Primary | ICD-10-CM

## 2025-04-24 NOTE — PROGRESS NOTES
[facility]    04/24/2025 10:57 AM    Assessment/Plan:    Patient presents today with therapeutic INR.    Assessment of patient findings and chart review: Reviewed     Recommendation for patient's warfarin regimen: Continue current maintenance dose    Recommend repeat INR in 1 week  _________________________________________________________________    Tavo Michaud Moniquetiffanievalerie (59 y.o.) is followed by the [facility].    Anticoagulation Summary  As of 4/24/2025      INR goal:  2.5-3.5   TTR:  12.9% (3.6 mo)   INR used for dosing:  3.3 (4/24/2025)   Warfarin maintenance plan:  25 mg (5 mg x 5) every Tue, Thu, Sat; 20 mg (5 mg x 4) all other days   Weekly warfarin total:  155 mg   Plan last modified:  Annabella Garrett, PharmD (1/16/2025)   Next INR check:  5/1/2025   Target end date:  Indefinite    Indications    Recurrent deep vein thrombosis (DVT) [I82.409]                 Anticoagulation Episode Summary       INR check location:  Clinic Lab    Preferred lab:  --    Send INR reminders to:  [facility] COUMADIN MONITORING POOL    Comments:  [facility]          Anticoagulation Care Providers       Provider Role Specialty Phone number    Paddy East MD [facility] Internal Medicine [phone]

## 2025-05-12 ENCOUNTER — PATIENT MESSAGE (OUTPATIENT)
Dept: ADMINISTRATIVE | Facility: HOSPITAL | Age: 60
End: 2025-05-12
Payer: MEDICAID

## 2025-06-10 ENCOUNTER — PATIENT MESSAGE (OUTPATIENT)
Dept: ADMINISTRATIVE | Facility: HOSPITAL | Age: 60
End: 2025-06-10
Payer: COMMERCIAL

## 2025-06-10 ENCOUNTER — TELEPHONE (OUTPATIENT)
Dept: PAIN MEDICINE | Facility: CLINIC | Age: 60
End: 2025-06-10
Payer: MEDICAID

## 2025-06-17 DIAGNOSIS — G47.33 OBSTRUCTIVE SLEEP APNEA: Primary | ICD-10-CM

## 2025-06-19 ENCOUNTER — ANTI-COAG VISIT (OUTPATIENT)
Dept: CARDIOLOGY | Facility: CLINIC | Age: 60
End: 2025-06-19
Payer: COMMERCIAL

## 2025-06-19 DIAGNOSIS — I82.409 RECURRENT DEEP VEIN THROMBOSIS (DVT): Primary | ICD-10-CM

## 2025-06-19 NOTE — PROGRESS NOTES
[facility]    2025 11:50 AM    Assessment/Plan:    Patient presents today with therapeutic INR.    Assessment of patient findings and chart review: Reviewed    Recommendation for patient's warfarin regimen: Continue current maintenance dose    Recommend repeat INR in 7 weeks  _________________________________________________________________    Tavo Jaswant Coffman (59 y.o.) is followed by the [facility].    Anticoagulation Summary  As of 2025      INR goal:  2.5-3.5   TTR:  42.6% (5.5 mo)   INR used for dosin.5 (2025)   Warfarin maintenance plan:  25 mg (5 mg x 5) every Tue, Thu, Sat; 20 mg (5 mg x 4) all other days   Weekly warfarin total:  155 mg   Plan last modified:  Annabella Garrett, PharmD (2025)   Next INR check:  2025   Target end date:  Indefinite    Indications    Recurrent deep vein thrombosis (DVT) [I82.409]                 Anticoagulation Episode Summary       INR check location:  Clinic Lab    Preferred lab:  --    Send INR reminders to:  [facility] COUMADIN MONITORING POOL    Comments:  [facility]          Anticoagulation Care Providers       Provider Role Specialty Phone number    Paddy East MD [facility] Internal Medicine [phone]